# Patient Record
Sex: FEMALE | URBAN - METROPOLITAN AREA
[De-identification: names, ages, dates, MRNs, and addresses within clinical notes are randomized per-mention and may not be internally consistent; named-entity substitution may affect disease eponyms.]

---

## 2021-05-27 ENCOUNTER — OFFICE VISIT (OUTPATIENT)
Dept: URBAN - METROPOLITAN AREA CLINIC 91 | Facility: CLINIC | Age: 26
End: 2021-05-27

## 2021-05-27 DIAGNOSIS — H11.422 CONJUNCTIVAL EDEMA, LEFT EYE: Primary | ICD-10-CM

## 2021-05-27 PROCEDURE — 99203 OFFICE O/P NEW LOW 30 MIN: CPT | Performed by: SPECIALIST

## 2021-05-27 NOTE — IMPRESSION/PLAN
Impression: Conjunctival edema, left eye: H11.422. Plan: Discuss findings with patient, conjunctival edema OS recommend to start Tobradex ST gtts QID x1 week then d/c, recommend AT's gtts OU as needed.